# Patient Record
Sex: MALE | Race: WHITE | NOT HISPANIC OR LATINO | Employment: FULL TIME | URBAN - METROPOLITAN AREA
[De-identification: names, ages, dates, MRNs, and addresses within clinical notes are randomized per-mention and may not be internally consistent; named-entity substitution may affect disease eponyms.]

---

## 2017-06-30 ENCOUNTER — OFFICE VISIT (OUTPATIENT)
Dept: URGENT CARE | Facility: CLINIC | Age: 42
End: 2017-06-30
Payer: COMMERCIAL

## 2017-06-30 PROCEDURE — 99203 OFFICE O/P NEW LOW 30 MIN: CPT

## 2023-03-19 ENCOUNTER — APPOINTMENT (EMERGENCY)
Dept: CT IMAGING | Facility: HOSPITAL | Age: 48
End: 2023-03-19

## 2023-03-19 ENCOUNTER — APPOINTMENT (OUTPATIENT)
Dept: MRI IMAGING | Facility: HOSPITAL | Age: 48
End: 2023-03-19

## 2023-03-19 ENCOUNTER — HOSPITAL ENCOUNTER (OUTPATIENT)
Facility: HOSPITAL | Age: 48
Setting detail: OBSERVATION
Discharge: HOME | End: 2023-03-19
Attending: FAMILY MEDICINE | Admitting: INTERNAL MEDICINE

## 2023-03-19 VITALS
HEART RATE: 79 BPM | TEMPERATURE: 99 F | BODY MASS INDEX: 31.81 KG/M2 | WEIGHT: 240 LBS | RESPIRATION RATE: 18 BRPM | DIASTOLIC BLOOD PRESSURE: 90 MMHG | HEIGHT: 73 IN | OXYGEN SATURATION: 95 % | SYSTOLIC BLOOD PRESSURE: 161 MMHG

## 2023-03-19 DIAGNOSIS — G51.0 FACIAL PARALYSIS ON LEFT SIDE: Primary | ICD-10-CM

## 2023-03-19 DIAGNOSIS — Z82.3 FAMILY HISTORY OF CVA: ICD-10-CM

## 2023-03-19 PROBLEM — R29.810 FACIAL DROOP: Status: ACTIVE | Noted: 2023-03-19

## 2023-03-19 LAB
2HR DELTA HS TROPONIN: -1 NG/L
4HR DELTA HS TROPONIN: -1 NG/L
ANION GAP SERPL CALCULATED.3IONS-SCNC: 7 MMOL/L (ref 4–13)
APTT PPP: 26 SECONDS (ref 23–37)
BUN SERPL-MCNC: 17 MG/DL (ref 5–25)
CALCIUM SERPL-MCNC: 9.2 MG/DL (ref 8.4–10.2)
CARDIAC TROPONIN I PNL SERPL HS: 5 NG/L
CARDIAC TROPONIN I PNL SERPL HS: 5 NG/L
CARDIAC TROPONIN I PNL SERPL HS: 6 NG/L
CHLORIDE SERPL-SCNC: 105 MMOL/L (ref 96–108)
CO2 SERPL-SCNC: 27 MMOL/L (ref 21–32)
CREAT SERPL-MCNC: 1.01 MG/DL (ref 0.6–1.3)
ERYTHROCYTE [DISTWIDTH] IN BLOOD BY AUTOMATED COUNT: 13 % (ref 11.6–15.1)
FLUAV RNA RESP QL NAA+PROBE: NEGATIVE
FLUBV RNA RESP QL NAA+PROBE: NEGATIVE
GFR SERPL CREATININE-BSD FRML MDRD: 88 ML/MIN/1.73SQ M
GLUCOSE SERPL-MCNC: 102 MG/DL (ref 65–140)
GLUCOSE SERPL-MCNC: 106 MG/DL (ref 65–140)
HCT VFR BLD AUTO: 45.6 % (ref 36.5–49.3)
HGB BLD-MCNC: 15.2 G/DL (ref 12–17)
INR PPP: 0.92 (ref 0.84–1.19)
MCH RBC QN AUTO: 31 PG (ref 26.8–34.3)
MCHC RBC AUTO-ENTMCNC: 33.3 G/DL (ref 31.4–37.4)
MCV RBC AUTO: 93 FL (ref 82–98)
PLATELET # BLD AUTO: 296 THOUSANDS/UL (ref 149–390)
PMV BLD AUTO: 9.4 FL (ref 8.9–12.7)
POTASSIUM SERPL-SCNC: 4 MMOL/L (ref 3.5–5.3)
PROTHROMBIN TIME: 12.4 SECONDS (ref 11.6–14.5)
RBC # BLD AUTO: 4.91 MILLION/UL (ref 3.88–5.62)
RSV RNA RESP QL NAA+PROBE: NEGATIVE
SARS-COV-2 RNA RESP QL NAA+PROBE: NEGATIVE
SODIUM SERPL-SCNC: 139 MMOL/L (ref 135–147)
WBC # BLD AUTO: 9.17 THOUSAND/UL (ref 4.31–10.16)

## 2023-03-19 RX ORDER — LORATADINE 10 MG/1
10 TABLET ORAL DAILY
Status: DISCONTINUED | OUTPATIENT
Start: 2023-03-19 | End: 2023-03-19 | Stop reason: HOSPADM

## 2023-03-19 RX ORDER — ACETAMINOPHEN 325 MG/1
650 TABLET ORAL EVERY 4 HOURS PRN
Status: DISCONTINUED | OUTPATIENT
Start: 2023-03-19 | End: 2023-03-19 | Stop reason: HOSPADM

## 2023-03-19 RX ORDER — ATORVASTATIN CALCIUM 40 MG/1
40 TABLET, FILM COATED ORAL EVERY EVENING
Status: DISCONTINUED | OUTPATIENT
Start: 2023-03-19 | End: 2023-03-19 | Stop reason: HOSPADM

## 2023-03-19 RX ORDER — ACYCLOVIR 200 MG/1
200 CAPSULE ORAL
Qty: 50 CAPSULE | Refills: 0 | Status: SHIPPED | OUTPATIENT
Start: 2023-03-19 | End: 2023-03-29

## 2023-03-19 RX ORDER — ASPIRIN 81 MG/1
81 TABLET, CHEWABLE ORAL DAILY
Status: DISCONTINUED | OUTPATIENT
Start: 2023-03-19 | End: 2023-03-19 | Stop reason: HOSPADM

## 2023-03-19 RX ORDER — PREDNISONE 20 MG/1
20 TABLET ORAL DAILY
Qty: 10 TABLET | Refills: 0 | Status: SHIPPED | OUTPATIENT
Start: 2023-03-19 | End: 2023-03-29

## 2023-03-19 RX ORDER — ONDANSETRON 2 MG/ML
4 INJECTION INTRAMUSCULAR; INTRAVENOUS EVERY 6 HOURS PRN
Status: DISCONTINUED | OUTPATIENT
Start: 2023-03-19 | End: 2023-03-19 | Stop reason: HOSPADM

## 2023-03-19 RX ORDER — ENOXAPARIN SODIUM 100 MG/ML
40 INJECTION SUBCUTANEOUS DAILY
Status: DISCONTINUED | OUTPATIENT
Start: 2023-03-19 | End: 2023-03-19 | Stop reason: HOSPADM

## 2023-03-19 RX ADMIN — ASPIRIN 81 MG 81 MG: 81 TABLET ORAL at 13:30

## 2023-03-19 RX ADMIN — Medication 1 TABLET: at 13:30

## 2023-03-19 RX ADMIN — LORATADINE 10 MG: 10 TABLET ORAL at 13:30

## 2023-03-19 RX ADMIN — ENOXAPARIN SODIUM 40 MG: 40 INJECTION SUBCUTANEOUS at 13:30

## 2023-03-19 RX ADMIN — IOHEXOL 85 ML: 350 INJECTION, SOLUTION INTRAVENOUS at 10:56

## 2023-03-19 NOTE — NURSING NOTE
IV site removed  Discharge instructions given to patient, verbalized understanding    Patient discharged via walking to car accompanied by spouse and PCA

## 2023-03-19 NOTE — PLAN OF CARE
Problem: Neurological Deficit  Goal: Neurological status is stable or improving  Description: Interventions:  - Monitor and assess patient's level of consciousness, motor function, sensory function, and level of assistance needed for ADLs  - Monitor and report changes from baseline  Collaborate with interdisciplinary team to initiate plan and implement interventions as ordered  - Provide and maintain a safe environment  - Consider seizure precautions  - Consider fall precautions  - Consider aspiration precautions  - Consider bleeding precautions  Outcome: Progressing     Problem: PAIN - ADULT  Goal: Verbalizes/displays adequate comfort level or baseline comfort level  Description: Interventions:  - Encourage patient to monitor pain and request assistance  - Assess pain using appropriate pain scale  - Administer analgesics based on type and severity of pain and evaluate response  - Implement non-pharmacological measures as appropriate and evaluate response  - Consider cultural and social influences on pain and pain management  - Notify physician/advanced practitioner if interventions unsuccessful or patient reports new pain  Outcome: Progressing     Problem: Knowledge Deficit  Goal: Patient/family/caregiver demonstrates understanding of disease process, treatment plan, medications, and discharge instructions  Description: Complete learning assessment and assess knowledge base    Interventions:  - Provide teaching at level of understanding  - Provide teaching via preferred learning methods  Outcome: Progressing

## 2023-03-19 NOTE — ASSESSMENT & PLAN NOTE
· Left sided facial droop this morning around 8 am    · Patient with hx of bells palsy on right side however patient concerned stating his father  for CVA at this age  · CT brain: No mass effect, acute intracranial hemorrhage or evidence of recent infarction  · CT head/neck: No evidence for high-grade stenosis, focal occlusion or vascular aneurysm of the cervical or intracranial vessels    · Stroke pathway with neuro checks and telemetry  · Start Lipitor 40 mg daily, aspirin 81 mg daily  · Check MRI brain  · PT/OT/Speech  · Neurology consult    MRI brain unremarkable  Likely Bell's palsy  Initiate acyclovir and prednisone x10 days

## 2023-03-19 NOTE — TELEMEDICINE
NEURO Code Stroke Note      REQUIRED DOCUMENTATION:      1  This service was provided via Telemedicine  2  Patient located at [facility]  3  TeleMed provider: Therese Bell MD   4  Identify all parties in room with patient during tele consult:  Nurse  5  Patient was then informed that this was a Telemedicine visit and that the exam was being conducted confidentially over secure lines  My office door was closed  No one else was in the room  Patient acknowledged consent and understanding of privacy and security of the Telemedicine visit, and gave us permission to have the assistant stay in the room in order to assist with the history and to conduct the exam   I informed the patient that I have reviewed their record in Epic and presented the opportunity for them to ask any questions regarding the visit today  The patient agreed to participate  Patient name: Anna Wheat  MRN: [id number]  Date time: 3/19/2023  Provider: Therese Bell MD        Time Code/Stroke called/paged: 3/19/2023 1037  Time Neurologist responded to Code/Stroke: 3/19/2023 immidiate  Neurological Consult Done: Tele      History of Present Illness: This is a 51 yo presented for evaluation of left-sided facial paralysis that he noticed around 8:00 on the morning of arrival  He was at his baseline last night when he went to sleep  There is no focal left vs right weakness/ numbness, speech or visual impairment  It appeared like [place] Palsy, though was stroke coded by ED PA just to be safe/ make sure he would not be thrombolytic/thrombectomy candidate  He does take a daily aspirin    Patient had Bell's palsy back in 2016, likely on the right side from which he recovered    Last Known Well  Last known well (date/time): 3/18/23 night, was noticed at 8 am on 3/19/23    Physical Examination  VS/Measurements:  /75   Pulse 83   Temp 97 6 °F (36 4 °C) (Temporal)   Resp 20   Ht 6' 1" (1 854 m)   Wt 109 kg (240 lb)   SpO2 94%   BMI 31 66 kg/m²     Review/Management:  Provider NIHSS: 3  Left complete facial droop, with partial sparing of forehead    Reviewed imaging for treatment decisions:     CT Head 3/19/2023 No mass effect, acute intracranial hemorrhage or evidence of recent infarction  CTA Head and neck 3/19/2023 No evidence for high-grade stenosis, focal occlusion or vascular aneurysm of the cervical or intracranial vessels    Treatment Exclusion Criteria  LKN undetermined  Less concerns for stroke, high risk from thrombolytics compared to possible benefits  Impression and Plan    [place] Palsy, AIS needs to be ruled out  MRI brain for completion of stroke workup  Continue ASA 81  Defer initiating steroids/ antiviral for bells palsy per primary team  Edna Zach from neuro standpoint for 7-10 days    Would recommend further management/ workup,if needed, based on MRI  Permissive HTN SBP < 220 for now, to ensure cerebral perfusion  Gradually return to normotension after 24 hrs  The above evaluation and recommendations are part of an acute stroke code  The purposes of this evaluation are for an immediate determination regarding the potential use of thrombolytics and other urgent stroke related evaluation/management  If ongoing neurological consultation is required by the medical team, feel free to call neurology         Sanford العلي MD  3/19/2023

## 2023-03-19 NOTE — ASSESSMENT & PLAN NOTE
· Left sided facial droop this morning around 8 am    · Patient with hx of bells palsy on right side however patient concerned stating his father  for CVA at this age  · CT brain: No mass effect, acute intracranial hemorrhage or evidence of recent infarction  · CT head/neck: No evidence for high-grade stenosis, focal occlusion or vascular aneurysm of the cervical or intracranial vessels    · Stroke pathway with neuro checks and telemetry  · Start Lipitor 40 mg daily, aspirin 81 mg daily  · Check MRI brain  · PT/OT/Speech  · Neurology consult

## 2023-03-19 NOTE — ED PROVIDER NOTES
History  Chief Complaint   Patient presents with   • Medical Problem     Left sided facial weakness that began this morning around 180     59-year-old male with no pertinent medical history presents to the emergency department for evaluation of left-sided facial paralysis that began at 8:00 this morning  Patient has no other complaints at this time but is concerned for stroke as his father  around his age of a stroke  Patient does have bruising of his right eye from a recent injury from his dog that occurred last week  He has had no headache or dizziness or nausea or vomiting after this  He does take a daily aspirin  Had no trouble ambulating to the emergency department today and wife is in the room and reports no slurred speech or change in mentation  Patient reports no other focal weakness, numbness/tingling  Denies fevers, chills, chest pain, shortness of breath, palpitations, abdominal pain, nausea, vomiting, leg swelling  Spouse does state that he was drinking last night but no other drug use  No recent medication changes  Spouse does report that patient had Bell's palsy back in 2016 on the right side  History provided by:  Patient   used: No        Prior to Admission Medications   Prescriptions Last Dose Informant Patient Reported? Taking?    Multiple Vitamins-Minerals (multivitamin with minerals) tablet   Yes No   Sig: Take 1 tablet by mouth daily   fexofenadine (ALLEGRA) 180 MG tablet   Yes No   Sig: Take 180 mg by mouth daily      Facility-Administered Medications: None       Past Medical History:   Diagnosis Date   • Allergic rhinitis        Past Surgical History:   Procedure Laterality Date   • ADENOIDECTOMY     • APPENDECTOMY     • INGUINAL HERNIA REPAIR     • POLYPECTOMY     • SINUS SURGERY     • TONSILLECTOMY         Family History   Problem Relation Age of Onset   • No Known Problems Mother    • Heart failure Father    • Kidney cancer Maternal Grandfather      I have reviewed and agree with the history as documented  E-Cigarette/Vaping   • E-Cigarette Use Never User      E-Cigarette/Vaping Substances   • Nicotine No    • THC No    • CBD No    • Flavoring No    • Other No    • Unknown No      Social History     Tobacco Use   • Smoking status: Never   • Smokeless tobacco: Never   Vaping Use   • Vaping Use: Never used   Substance Use Topics   • Alcohol use: Yes   • Drug use: Never       Review of Systems   Constitutional: Negative for chills and fever  HENT: Negative for ear pain and sore throat  Eyes: Negative for pain and visual disturbance  Respiratory: Negative for cough and shortness of breath  Cardiovascular: Negative for chest pain and palpitations  Gastrointestinal: Negative for abdominal pain and vomiting  Genitourinary: Negative for dysuria and hematuria  Musculoskeletal: Negative for arthralgias, back pain and neck pain  Skin: Negative for color change and rash  Neurological: Positive for facial asymmetry  Negative for dizziness, seizures, syncope, numbness and headaches  Psychiatric/Behavioral: Negative for confusion  All other systems reviewed and are negative  Physical Exam  Physical Exam  Vitals and nursing note reviewed  Constitutional:       General: He is not in acute distress  Appearance: Normal appearance  He is well-developed and normal weight  He is not ill-appearing  HENT:      Head: Normocephalic and atraumatic  Right Ear: Tympanic membrane and external ear normal       Left Ear: Tympanic membrane and external ear normal       Nose: Nose normal       Mouth/Throat:      Mouth: Mucous membranes are moist       Pharynx: Oropharynx is clear  Eyes:      General: No scleral icterus  Right eye: No discharge  Left eye: No discharge  Conjunctiva/sclera: Conjunctivae normal    Cardiovascular:      Rate and Rhythm: Normal rate  Pulses: Normal pulses  Heart sounds: Normal heart sounds  Pulmonary:      Effort: Pulmonary effort is normal  No respiratory distress  Breath sounds: Normal breath sounds  Abdominal:      General: Abdomen is flat  Palpations: Abdomen is soft  Tenderness: There is no abdominal tenderness  Musculoskeletal:         General: No swelling, tenderness or signs of injury  Normal range of motion  Cervical back: Normal range of motion and neck supple  No rigidity  Skin:     General: Skin is warm and dry  Capillary Refill: Capillary refill takes less than 2 seconds  Findings: No bruising, erythema or rash  Neurological:      Mental Status: He is alert and oriented to person, place, and time  Cranial Nerves: Cranial nerve deficit (left sided facial asymmetry, clear drainage from left eye noted and difficulty closing left eye  No forehead involvement  ) present  Sensory: No sensory deficit  Coordination: Coordination normal    Psychiatric:         Mood and Affect: Mood normal          Behavior: Behavior normal          Thought Content:  Thought content normal          Vital Signs  ED Triage Vitals [03/19/23 1030]   Temperature Pulse Respirations Blood Pressure SpO2   97 6 °F (36 4 °C) 97 20 158/90 97 %      Temp Source Heart Rate Source Patient Position - Orthostatic VS BP Location FiO2 (%)   Temporal Monitor Sitting Left arm --      Pain Score       No Pain           Vitals:    03/19/23 1145 03/19/23 1200 03/19/23 1215 03/19/23 1236   BP: 151/88 146/79 138/75 147/97   Pulse: 76 82 83 83   Patient Position - Orthostatic VS:    Lying         Visual Acuity  Visual Acuity    Flowsheet Row Most Recent Value   L Pupil Size (mm) 3   R Pupil Size (mm) 3          ED Medications  Medications   loratadine (CLARITIN) tablet 10 mg (has no administration in time range)   multivitamin-minerals (CENTRUM) tablet 1 tablet (has no administration in time range)   atorvastatin (LIPITOR) tablet 40 mg (has no administration in time range)   ondansetron [facility]) injection 4 mg (has no administration in time range)   acetaminophen (TYLENOL) tablet 650 mg (has no administration in time range)   aspirin chewable tablet 81 mg (has no administration in time range)   enoxaparin (LOVENOX) subcutaneous injection 40 mg (has no administration in time range)   iohexol (OMNIPAQUE) 350 MG/ML injection (SINGLE-DOSE) 85 mL (85 mL Intravenous Given 3/19/23 1056)       Diagnostic Studies  Results Reviewed     Procedure Component Value Units Date/Time    HS Troponin I 2hr [[id number]] Collected: 03/19/23 1300    Lab Status: In process Specimen: Blood from Arm, Right Updated: 03/19/23 1303    HS Troponin I 4hr [[id number]]     Lab Status: No result Specimen: Blood     FLU/RSV/COVID - if FLU/RSV clinically relevant [[id number]]  (Normal) Collected: 03/19/23 1044    Lab Status: Final result Specimen: Nares from Nose Updated: 03/19/23 1130     SARS-CoV-2 Negative     INFLUENZA A PCR Negative     INFLUENZA B PCR Negative     RSV PCR Negative    Narrative:      FOR PEDIATRIC PATIENTS - copy/paste COVID Guidelines URL to browser: [email or web address]    SARS-CoV-2 assay is a Nucleic Acid Amplification assay intended for the  qualitative detection of nucleic acid from SARS-CoV-2 in nasopharyngeal  swabs  Results are for the presumptive identification of SARS-CoV-2 RNA  Positive results are indicative of infection with SARS-CoV-2, the virus  causing COVID-19, but do not rule out bacterial infection or co-infection  with other viruses  Laboratories within the [place] and its  territories are required to report all positive results to the appropriate  public health authorities  Negative results do not preclude SARS-CoV-2  infection and should not be used as the sole basis for treatment or other  patient management decisions   Negative results must be combined with  clinical observations, patient history, and epidemiological information  This test has not been FDA cleared or approved  This test has been authorized by FDA under an Emergency Use Authorization  (EUA)  This test is only authorized for the duration of time the  declaration that circumstances exist justifying the authorization of the  emergency use of an in vitro diagnostic tests for detection of SARS-CoV-2  virus and/or diagnosis of COVID-19 infection under section 564(b)(1) of  the Act, 21 U  S C  618BYO-2(G)(3), unless the authorization is terminated  or revoked sooner  The test has been validated but independent review by FDA  and CLIA is pending  Test performed using [employer] GeneXpert: This RT-PCR assay targets N2,  a region unique to SARS-CoV-2  A conserved region in the E-gene was chosen  for pan-Sarbecovirus detection which includes SARS-CoV-2  According to CMS-2020-01-R, this platform meets the definition of high-throughput technology      HS Troponin 0hr (reflex protocol) [[id number]]  (Normal) Collected: 03/19/23 1044    Lab Status: Final result Specimen: Blood from Arm, Left Updated: 03/19/23 1114     hs TnI 0hr 6 ng/L     Basic metabolic panel [[id number]] Collected: 03/19/23 1044    Lab Status: Final result Specimen: Blood from Arm, Left Updated: 03/19/23 1110     Sodium 139 mmol/L      Potassium 4 0 mmol/L      Chloride 105 mmol/L      CO2 27 mmol/L      ANION GAP 7 mmol/L      BUN 17 mg/dL      Creatinine 1 01 mg/dL      Glucose 102 mg/dL      Calcium 9 2 mg/dL      eGFR 88 ml/min/1 73sq m     Narrative:      [place] guidelines for Chronic Kidney Disease (CKD):   •  Stage 1 with normal or high GFR (GFR > 90 mL/min/1 73 square meters)  •  Stage 2 Mild CKD (GFR = 60-89 mL/min/1 73 square meters)  •  Stage 3A Moderate CKD (GFR = 45-59 mL/min/1 73 square meters)  •  Stage 3B Moderate CKD (GFR = 30-44 mL/min/1 73 square meters)  •  Stage 4 Severe CKD (GFR = 15-29 mL/min/1 73 square meters)  •  Stage 5 End Stage CKD (GFR <15 mL/min/1 73 square meters)  Note: GFR calculation is accurate only with a steady state creatinine    Protime-INR [[id number]]  (Normal) Collected: 03/19/23 1044    Lab Status: Final result Specimen: Blood from Arm, Left Updated: 03/19/23 1104     Protime 12 4 seconds      INR 0 92    APTT [[id number]]  (Normal) Collected: 03/19/23 1044    Lab Status: Final result Specimen: Blood from Arm, Left Updated: 03/19/23 1104     PTT 26 seconds     CBC and Platelet [[id number]]  (Normal) Collected: 03/19/23 1044    Lab Status: Final result Specimen: Blood from Arm, Left Updated: 03/19/23 1053     WBC 9 17 Thousand/uL      RBC 4 91 Million/uL      Hemoglobin 15 2 g/dL      Hematocrit 45 6 %      MCV 93 fL      MCH 31 0 pg      MCHC 33 3 g/dL      RDW 13 0 %      Platelets 462 Thousands/uL      MPV 9 4 fL     Fingerstick Glucose (POCT) [[id number]]  (Normal) Collected: 03/19/23 1038    Lab Status: Final result Updated: 03/19/23 1040     POC Glucose 106 mg/dl                  CTA stroke alert (head/neck)   Final Result by Paulino Garcia MD (03/19 1107)      No evidence for high-grade stenosis, focal occlusion or vascular aneurysm of the cervical or intracranial vessels  Findings were directly discussed with Dr Malathi Padilla at 11:00 AM                            Workstation performed: [id number]         CT stroke alert brain   Final Result by Paulino Garcia MD (03/19 1108)      No mass effect, acute intracranial hemorrhage or evidence of recent infarction        Findings were directly discussed with Dr Malathi Padilla at 11:00 AM       Workstation performed: [id number]         MRI Inpatient Order    (Results Pending)              Procedures  ECG 12 Lead Documentation Only    Date/Time: 3/19/2023 10:53 AM  Performed by: Leonid Marti PA-C  Authorized by: Leonid Marti PA-C     Indications / Diagnosis:  Stroke-like symptoms  ECG reviewed by me, the ED Provider: yes    Patient location:  ED  Interpretation: Interpretation: normal    Rate:     ECG rate:  77    ECG rate assessment: normal    Rhythm:     Rhythm: sinus rhythm    Ectopy:     Ectopy: none    QRS:     QRS axis:  Normal    QRS intervals:  Normal  Conduction:     Conduction: normal    ST segments:     ST segments:  Normal  T waves:     T waves: normal               ED Course  ED Course as of 03/19/23 1312   Sun Mar 19, 2023   1043 Spoke with neuro over the phone  No thrombolytics at this time d/t low NIH with only left sided facial parylsis, clearly not debilitating  Will continue with stroke workup  1200 Spoke with patient regarding the negative images and bloodwork  I informed him that there is a possibility that the stroke was missed on CT imaging and the best test is an MRI  Patient is agreeable to this and is agreeable to admission at this time under stroke protocol  Will discuss with SLIM   1211 TT sent to [facility] Most Recent Value   Heart Score Risk Calculator    History 0 Filed at: 03/19/2023 1306   ECG 0 Filed at: 03/19/2023 1306   Age 1 Filed at: 03/19/2023 1306   Risk Factors 1 Filed at: 03/19/2023 1306   Troponin 0 Filed at: 03/19/2023 1306   HEART Score 2 Filed at: 03/19/2023 1306           Stroke Assessment     Row Name 03/19/23 1116             NIH Stroke Scale    Interval Baseline      Level of Consciousness (1a ) 0      LOC Questions (1b ) 0      LOC Commands (1c ) 0      Best Gaze (2 ) 0      Visual (3 ) 0      Facial Palsy (4 ) 2      Motor Arm, Left (5a ) 0      Motor Arm, Right (5b ) 0      Motor Leg, Left (6a ) 0      Motor Leg, Right (6b ) 0      Limb Ataxia (7 ) 0      Sensory (8 ) 0      Best Language (9 ) 0      Dysarthria (10 ) 0      Extinction and Inattention (11 ) (Formerly Neglect) 0      Total 2              Flowsheet Row Most Recent Value   Thrombolytic Decision Options    Thrombolytic Decision Patient not a candidate     Patient is not a candidate options Symptoms resolved/clearly non disabling  Medical Decision Making  80-year-old male without pertinent medical history presents emergency department for evaluation of left-sided facial paralysis that began at 0800 this morning  No personal history of CVAs but does have a father that had a stroke around his age  Patient is in no acute distress, overall nontoxic-appearing  There is left-sided facial paralysis/facial asymmetry without involvement of the forehead however does not involve the eye  Otherwise neurologic exam   NIH 2 at baseline  Stroke alert called  Discussed case with neurology who recommends to continue with the stroke pathway with MRI of the brain  Differential: Bell's palsy, stroke, TIA    Plan: CT/CTA, IV, bloodwork, ECG  Results and dispo: Scans negative for ischemic changes  Bloodwork reassuring  ECG stable showing no ischemic changes  Neuro recommending to continue with stroke pathway for brain MRI to rule out stroke  I informed patient and family of hte results thus far and the plan for brain MRI  Unfortunately, unable to get MRI on weekends, so will need admission under stroke pathway for neuro checks and neurology consult  Patient agreeable to this plan  Discussed case with SLIM  Agreeable for obs  Patient admitted in stable condition  Facial paralysis on left side: acute illness or injury  Family history of CVA: chronic illness or injury  Amount and/or Complexity of Data Reviewed  Labs: ordered  Radiology: ordered  ECG/medicine tests: ordered and independent interpretation performed  Discussion of management or test interpretation with external provider(s): Dante (neuro)    Risk  Prescription drug management  Decision regarding hospitalization            Disposition  Final diagnoses:   Facial paralysis on left side   Family history of CVA     Time reflects when diagnosis was documented in both MDM as applicable and the Disposition within this note     Time User Action Codes Description Comment    3/19/2023 10:42 AM Graeme Schirmer Add [G51 0] Facial paralysis on left side     3/19/2023 12:12 PM Graeme Schirmer Add [Z82 3] Family history of CVA       ED Disposition     ED Disposition   Admit    Condition   Stable    Date/Time   Sun Mar 19, 2023 12:12 PM    Comment   Case was discussed with Dany Scott and the patient's admission status was agreed to be Admission Status: observation status to the service of Dr Jossy Siddiqui   Follow-up Information    None         Current Discharge Medication List      CONTINUE these medications which have NOT CHANGED    Details   fexofenadine (ALLEGRA) 180 MG tablet Take 180 mg by mouth daily      Multiple Vitamins-Minerals (multivitamin with minerals) tablet Take 1 tablet by mouth daily             No discharge procedures on file      PDMP Review     None          ED Provider  Electronically Signed by           Simeon Butterfield PA-C  03/19/23 1625

## 2023-03-19 NOTE — H&P
[address] 1975, 52 y o  male MRN: [id number]  Unit/Bed#: -01 Encounter: [id number]  Primary Care Provider: Afsaneh Luque DO   Date and time admitted to hospital: 3/19/2023 10:32 AM    * Facial droop  Assessment & Plan  · Left sided facial droop this morning around 8 am    · Patient with hx of bells palsy on right side however patient concerned stating his father  for CVA at this age  · CT brain: No mass effect, acute intracranial hemorrhage or evidence of recent infarction  · CT head/neck: No evidence for high-grade stenosis, focal occlusion or vascular aneurysm of the cervical or intracranial vessels  · Stroke pathway with neuro checks and telemetry  · Start Lipitor 40 mg daily, aspirin 81 mg daily  · Check MRI brain  · PT/OT/Speech  · Neurology consult    VTE Pharmacologic Prophylaxis: VTE Score: 7 High Risk (Score >/= 5) - Pharmacological DVT Prophylaxis Ordered: enoxaparin (Lovenox)  Sequential Compression Devices Ordered  Code Status: Level 1 - Full Code   Discussion with family: Updated  (wife) at bedside  Anticipated Length of Stay: Patient will be admitted on an observation basis with an anticipated length of stay of less than 2 midnights secondary to workup of left facial droop  Total Time Spent on Date of Encounter in care of patient: 65 minutes This time was spent on one or more of the following: performing physical exam; counseling and coordination of care; obtaining or reviewing history; documenting in the medical record; reviewing/ordering tests, medications or procedures; communicating with other healthcare professionals and discussing with patient's family/caregivers  Chief Complaint: left facial droop    History of Present Illness:  Anna Cummins is a 52 y o  male with no PMH who presents with a complaint of left facial droop  The patient states it started at 8 am this morning   He has a history of [place] palsy on the right side at the age of 12  The patient reports his father dying of a stroke at his age which concerned him  He denies any upper or lower extremity weakness, changes in vision, headaches, changes in speech  No difficulty swallowing  Review of Systems:  Review of Systems   Neurological: Positive for facial asymmetry  All other systems reviewed and are negative  Past Medical and Surgical History:   Past Medical History:   Diagnosis Date   • Allergic rhinitis        Past Surgical History:   Procedure Laterality Date   • ADENOIDECTOMY     • APPENDECTOMY     • INGUINAL HERNIA REPAIR     • POLYPECTOMY     • SINUS SURGERY     • TONSILLECTOMY         Meds/Allergies:  Prior to Admission medications    Medication Sig Start Date End Date Taking? Authorizing Provider   fexofenadine (ALLEGRA) 180 MG tablet Take 180 mg by mouth daily    Historical Provider, MD   Multiple Vitamins-Minerals (multivitamin with minerals) tablet Take 1 tablet by mouth daily    Historical Provider, MD     I have reviewed home medications with patient personally      Allergies: No Known Allergies    Social History:  Marital Status:    Occupation: unknown  Patient Pre-hospital Living Situation: Home  Patient Pre-hospital Level of Mobility: walks  Patient Pre-hospital Diet Restrictions: none  Substance Use History:   Social History     Substance and Sexual Activity   Alcohol Use Yes     Social History     Tobacco Use   Smoking Status Never   Smokeless Tobacco Never     Social History     Substance and Sexual Activity   Drug Use Never       Family History:  Family History   Problem Relation Age of Onset   • No Known Problems Mother    • Heart failure Father    • Kidney cancer Maternal Grandfather        Physical Exam:     Vitals:   Blood Pressure: 147/97 (03/19/23 1236)  Pulse: 83 (03/19/23 1236)  Temperature: 99 1 °F (37 3 °C) (03/19/23 1236)  Temp Source: Oral (03/19/23 1236)  Respirations: 18 (03/19/23 1236)  Height: 6' 1" (185 4 cm) (03/19/23 1030)  Weight - Scale: 109 kg (240 lb) (03/19/23 1030)  SpO2: 94 % (03/19/23 1236)    Physical Exam  Vitals and nursing note reviewed  Constitutional:       General: He is awake  Appearance: Normal appearance  HENT:      Head: Normocephalic  Comments: Ecchymosis around right eye  Cardiovascular:      Rate and Rhythm: Normal rate and regular rhythm  Heart sounds: Normal heart sounds  Pulmonary:      Effort: Pulmonary effort is normal       Breath sounds: Normal breath sounds  Abdominal:      Palpations: Abdomen is soft  Tenderness: There is no abdominal tenderness  Skin:     General: Skin is warm and dry  Neurological:      Mental Status: He is alert and oriented to person, place, and time  Cranial Nerves: Facial asymmetry (left facial droop) present  Psychiatric:         Attention and Perception: Attention normal          Speech: Speech normal          Behavior: Behavior normal  Behavior is cooperative            Additional Data:     Lab Results:  Results from last 7 days   Lab Units 03/19/23  1044   WBC Thousand/uL 9 17   HEMOGLOBIN g/dL 15 2   HEMATOCRIT % 45 6   PLATELETS Thousands/uL 296     Results from last 7 days   Lab Units 03/19/23  1044   SODIUM mmol/L 139   POTASSIUM mmol/L 4 0   CHLORIDE mmol/L 105   CO2 mmol/L 27   BUN mg/dL 17   CREATININE mg/dL 1 01   ANION GAP mmol/L 7   CALCIUM mg/dL 9 2   GLUCOSE RANDOM mg/dL 102     Results from last 7 days   Lab Units 03/19/23  1044   INR  0 92     Results from last 7 days   Lab Units 03/19/23  1038   POC GLUCOSE mg/dl 106               Lines/Drains:  Invasive Devices     Peripheral Intravenous Line  Duration           Peripheral IV 03/19/23 Distal;Left;Upper;Ventral (anterior) Arm <1 day                    Imaging: Reviewed radiology reports from this admission including: CT head  CTA stroke alert (head/neck)   Final Result by Jorge Andres MD (03/19 1107)      No evidence for high-grade stenosis, focal occlusion or vascular aneurysm of the cervical or intracranial vessels  Findings were directly discussed with Dr Lin Hopkins at 11:00 AM                            Workstation performed: [id number]         CT stroke alert brain   Final Result by Malini Helms MD (03/19 1108)      No mass effect, acute intracranial hemorrhage or evidence of recent infarction  Findings were directly discussed with Dr Lin Hopkins at 11:00 AM       Workstation performed: [id number]         MRI Inpatient Order    (Results Pending)       EKG and Other Studies Reviewed on Admission:   · EKG: NSR  HR 77     ** Please Note: This note has been constructed using a voice recognition system   **

## 2023-03-19 NOTE — DISCHARGE SUMMARY
[address]  Discharge- Lailan Person 1975, 52 y o  male MRN: [id number]  Unit/Bed#: -01 Encounter: [id number]  Primary Care Provider: Edyta De Paz DO   Date and time admitted to hospital: 3/19/2023 10:32 AM    * Facial droop  Assessment & Plan  · Left sided facial droop this morning around 8 am    · Patient with hx of bells palsy on right side however patient concerned stating his father  for CVA at this age  · CT brain: No mass effect, acute intracranial hemorrhage or evidence of recent infarction  · CT head/neck: No evidence for high-grade stenosis, focal occlusion or vascular aneurysm of the cervical or intracranial vessels  · Stroke pathway with neuro checks and telemetry  · Start Lipitor 40 mg daily, aspirin 81 mg daily  · Check MRI brain  · PT/OT/Speech  · Neurology consult    MRI brain unremarkable  Likely Bell's palsy  Initiate acyclovir and prednisone x10 days      Discharging Physician / Practitioner: Ivette Macias DO  PCP: Edyta De Paz DO  Admission Date:   Admission Orders (From admission, onward)     Ordered        23 1213  Place in Observation  Once                      Discharge Date: 23    Medical Problems     Resolved Problems  Date Reviewed: 3/19/2023   None         Consultations During Hospital Stay: Neurology    Procedures Performed: Not applicable    Significant Findings / Test Results:     MRI brain wo contrast    Result Date: 3/19/2023  Impression: No mass effect, acute intracranial hemorrhage or evidence of recent infarction  Nonspecific punctate focus of FLAIR signal hyperintensity within the right frontal lobe  Workstation performed: [id number]     CT stroke alert brain    Result Date: 3/19/2023  Impression: No mass effect, acute intracranial hemorrhage or evidence of recent infarction   Findings were directly discussed with Dr Heather Rogers at 11:00 AM  Workstation performed: [id number]     CTA stroke alert (head/neck)    Result Date: 3/19/2023  Impression: No evidence for high-grade stenosis, focal occlusion or vascular aneurysm of the cervical or intracranial vessels  Findings were directly discussed with Dr Columba Nievse at 11:00 AM  Workstation performed: [id number]       Incidental Findings: Not applicable    Test Results Pending at Discharge (will require follow up): Not applicable     Outpatient Tests Requested: Not applicable    Reason for Admission: Facial droop    Hospital Course:     Kishan Poe is a 52 y o  male with no PMH who presents with a complaint of left facial droop  The patient states it started at 8 am this morning  He has a history of [place] palsy on the right side at the age of 12  The patient reports his father dying of a stroke at his age which concerned him  He denies any upper or lower extremity weakness, changes in vision, headaches, changes in speech  No difficulty swallowing  MRI brain unremarkable  Likely Bell's palsy  The patient was initiated on acyclovir and prednisone x10 days  He was encouraged to follow-up with his primary care physician within 7-14 days  He was also encouraged to take acyclovir and prednisone x10 days  Condition at Discharge: stable     Discharge Day Visit / Exam:     Subjective:  Patient seen and examined at bedside  No acute events overnight  Denies chest pain, SOB, diaphoresis, nausea/vomiting/diarrhea, fevers/chills  Vitals: Blood Pressure: 142/95 (03/19/23 1436)  Pulse: 71 (03/19/23 1436)  Temperature: 99 8 °F (37 7 °C) (03/19/23 1436)  Temp Source: Oral (03/19/23 1436)  Respirations: 18 (03/19/23 1436)  Height: 6' 1" (185 4 cm) (03/19/23 1030)  Weight - Scale: 109 kg (240 lb) (03/19/23 1030)  SpO2: 95 % (03/19/23 1349)     Exam:   Physical Exam  Vitals and nursing note reviewed  Constitutional:       General: He is not in acute distress  Appearance: He is well-developed  HENT:      Head: Normocephalic and atraumatic     Eyes:      Conjunctiva/sclera: Conjunctivae normal    Cardiovascular:      Rate and Rhythm: Normal rate and regular rhythm  Heart sounds: No murmur heard  Pulmonary:      Effort: Pulmonary effort is normal  No respiratory distress  Breath sounds: Normal breath sounds  Abdominal:      Palpations: Abdomen is soft  Tenderness: There is no abdominal tenderness  Musculoskeletal:         General: No swelling  Cervical back: Neck supple  Skin:     General: Skin is warm and dry  Capillary Refill: Capillary refill takes less than 2 seconds  Neurological:      Mental Status: He is alert  Psychiatric:         Mood and Affect: Mood normal        Discharge instructions/Information to patient and family:   See after visit summary for information provided to patient and family  Provisions for Follow-Up Care:  See after visit summary for information related to follow-up care and any pertinent home health orders  Disposition:     Home with family support  Outpatient follow-up with PCP  Acyclovir and Prednisone x10 days  Resume preadmission medications     Discharge Statement:  I spent 60 minutes discharging the patient  This time was spent on the day of discharge  I had direct contact with the patient on the day of discharge  Greater than 50% of the total time was spent examining patient, answering all patient questions, arranging and discussing plan of care with patient as well as directly providing post-discharge instructions  Additional time then spent on discharge activities  Discharge Medications:  See after visit summary for reconciled discharge medications provided to patient and family        ** Please Note: This note has been constructed using a voice recognition system **